# Patient Record
Sex: FEMALE | URBAN - METROPOLITAN AREA
[De-identification: names, ages, dates, MRNs, and addresses within clinical notes are randomized per-mention and may not be internally consistent; named-entity substitution may affect disease eponyms.]

---

## 2020-06-03 ENCOUNTER — APPOINTMENT (OUTPATIENT)
Dept: URBAN - METROPOLITAN AREA CLINIC 282 | Age: 75
Setting detail: DERMATOLOGY
End: 2020-06-03

## 2020-06-03 DIAGNOSIS — L29.8 OTHER PRURITUS: ICD-10-CM

## 2020-06-03 DIAGNOSIS — D69.2 OTHER NONTHROMBOCYTOPENIC PURPURA: ICD-10-CM

## 2020-06-03 PROCEDURE — OTHER MIPS QUALITY: OTHER

## 2020-06-03 PROCEDURE — OTHER TREATMENT REGIMEN: OTHER

## 2020-06-03 PROCEDURE — OTHER COUNSELING: OTHER

## 2020-06-03 PROCEDURE — 99203 OFFICE O/P NEW LOW 30 MIN: CPT

## 2020-06-03 PROCEDURE — OTHER PRESCRIPTION: OTHER

## 2020-06-03 RX ORDER — TRIAMCINOLONE ACETONIDE 1 MG/G
CREAM TOPICAL BID
Qty: 1 | Refills: 1 | Status: ERX | COMMUNITY
Start: 2020-06-03

## 2020-06-03 ASSESSMENT — LOCATION DETAILED DESCRIPTION DERM
LOCATION DETAILED: LEFT PROXIMAL PRETIBIAL REGION
LOCATION DETAILED: SUPERIOR THORACIC SPINE
LOCATION DETAILED: RIGHT DISTAL DORSAL FOREARM
LOCATION DETAILED: RIGHT PROXIMAL POSTERIOR UPPER ARM
LOCATION DETAILED: LEFT DISTAL CALF
LOCATION DETAILED: RIGHT LATERAL PROXIMAL PRETIBIAL REGION
LOCATION DETAILED: RIGHT DISTAL POSTERIOR THIGH
LOCATION DETAILED: LEFT MEDIAL FRONTAL SCALP
LOCATION DETAILED: EPIGASTRIC SKIN
LOCATION DETAILED: RIGHT ANTERIOR LATERAL PROXIMAL THIGH
LOCATION DETAILED: LEFT DISTAL POSTERIOR UPPER ARM
LOCATION DETAILED: LEFT ANTERIOR PROXIMAL THIGH
LOCATION DETAILED: LEFT DISTAL DORSAL FOREARM
LOCATION DETAILED: LEFT DISTAL POSTERIOR THIGH
LOCATION DETAILED: RIGHT OCCIPITAL SCALP

## 2020-06-03 ASSESSMENT — LOCATION ZONE DERM
LOCATION ZONE: ARM
LOCATION ZONE: TRUNK
LOCATION ZONE: SCALP
LOCATION ZONE: LEG

## 2020-06-03 ASSESSMENT — LOCATION SIMPLE DESCRIPTION DERM
LOCATION SIMPLE: ABDOMEN
LOCATION SIMPLE: RIGHT THIGH
LOCATION SIMPLE: RIGHT PRETIBIAL REGION
LOCATION SIMPLE: RIGHT POSTERIOR UPPER ARM
LOCATION SIMPLE: POSTERIOR SCALP
LOCATION SIMPLE: UPPER BACK
LOCATION SIMPLE: LEFT FOREARM
LOCATION SIMPLE: RIGHT POSTERIOR THIGH
LOCATION SIMPLE: LEFT CALF
LOCATION SIMPLE: LEFT THIGH
LOCATION SIMPLE: LEFT POSTERIOR THIGH
LOCATION SIMPLE: RIGHT FOREARM
LOCATION SIMPLE: LEFT POSTERIOR UPPER ARM
LOCATION SIMPLE: LEFT PRETIBIAL REGION
LOCATION SIMPLE: LEFT SCALP

## 2020-06-03 NOTE — PROCEDURE: TREATMENT REGIMEN
Plan: Wash hair daily
Detail Level: Zone
Samples Given: Sarna anti cream
Otc Regimen: Aveeno oatmeal \\n\\nZyrtec QAM

## 2021-07-22 ENCOUNTER — APPOINTMENT (OUTPATIENT)
Dept: URBAN - METROPOLITAN AREA CLINIC 282 | Age: 76
Setting detail: DERMATOLOGY
End: 2021-07-25

## 2021-07-22 DIAGNOSIS — Z71.89 OTHER SPECIFIED COUNSELING: ICD-10-CM

## 2021-07-22 DIAGNOSIS — L82.1 OTHER SEBORRHEIC KERATOSIS: ICD-10-CM

## 2021-07-22 DIAGNOSIS — L57.8 OTHER SKIN CHANGES DUE TO CHRONIC EXPOSURE TO NONIONIZING RADIATION: ICD-10-CM

## 2021-07-22 DIAGNOSIS — D18.0 HEMANGIOMA: ICD-10-CM

## 2021-07-22 PROBLEM — D18.01 HEMANGIOMA OF SKIN AND SUBCUTANEOUS TISSUE: Status: ACTIVE | Noted: 2021-07-22

## 2021-07-22 PROCEDURE — OTHER COUNSELING: OTHER

## 2021-07-22 PROCEDURE — 99213 OFFICE O/P EST LOW 20 MIN: CPT

## 2021-07-22 ASSESSMENT — LOCATION ZONE DERM
LOCATION ZONE: TRUNK
LOCATION ZONE: ARM
LOCATION ZONE: FACE

## 2021-07-22 ASSESSMENT — LOCATION DETAILED DESCRIPTION DERM
LOCATION DETAILED: LEFT DISTAL DORSAL FOREARM
LOCATION DETAILED: LEFT CENTRAL MALAR CHEEK
LOCATION DETAILED: RIGHT SUPERIOR CENTRAL MALAR CHEEK
LOCATION DETAILED: RIGHT INFERIOR LATERAL MALAR CHEEK
LOCATION DETAILED: LEFT INFERIOR CENTRAL MALAR CHEEK
LOCATION DETAILED: RIGHT PROXIMAL DORSAL FOREARM
LOCATION DETAILED: LEFT SUPERIOR CENTRAL MALAR CHEEK
LOCATION DETAILED: RIGHT INFERIOR MEDIAL MALAR CHEEK
LOCATION DETAILED: INFERIOR MID FOREHEAD
LOCATION DETAILED: RIGHT DISTAL DORSAL FOREARM
LOCATION DETAILED: SUPERIOR THORACIC SPINE
LOCATION DETAILED: LEFT PROXIMAL DORSAL FOREARM

## 2021-07-22 ASSESSMENT — LOCATION SIMPLE DESCRIPTION DERM
LOCATION SIMPLE: LEFT CHEEK
LOCATION SIMPLE: RIGHT CHEEK
LOCATION SIMPLE: LEFT FOREARM
LOCATION SIMPLE: RIGHT FOREARM
LOCATION SIMPLE: UPPER BACK
LOCATION SIMPLE: INFERIOR FOREHEAD

## 2023-02-07 ENCOUNTER — APPOINTMENT (RX ONLY)
Dept: URBAN - METROPOLITAN AREA CLINIC 323 | Facility: CLINIC | Age: 78
Setting detail: DERMATOLOGY
End: 2023-02-07

## 2023-02-07 DIAGNOSIS — L81.4 OTHER MELANIN HYPERPIGMENTATION: ICD-10-CM

## 2023-02-07 DIAGNOSIS — D22 MELANOCYTIC NEVI: ICD-10-CM

## 2023-02-07 DIAGNOSIS — L82.1 OTHER SEBORRHEIC KERATOSIS: ICD-10-CM

## 2023-02-07 DIAGNOSIS — D18.0 HEMANGIOMA: ICD-10-CM

## 2023-02-07 PROBLEM — D22.9 MELANOCYTIC NEVI, UNSPECIFIED: Status: ACTIVE | Noted: 2023-02-07

## 2023-02-07 PROBLEM — D18.01 HEMANGIOMA OF SKIN AND SUBCUTANEOUS TISSUE: Status: ACTIVE | Noted: 2023-02-07

## 2023-02-07 PROCEDURE — ? COUNSELING

## 2023-02-07 PROCEDURE — 99203 OFFICE O/P NEW LOW 30 MIN: CPT

## 2023-02-07 PROCEDURE — ? ADDITIONAL NOTES

## 2023-02-07 PROCEDURE — ? FULL BODY SKIN EXAM

## 2023-02-07 PROCEDURE — ? TREATMENT REGIMEN

## 2023-02-07 NOTE — PROCEDURE: ADDITIONAL NOTES
Additional Notes: PT DECLINED LOWER LEGS AND FEET EXAM
Render Risk Assessment In Note?: no
Detail Level: Simple